# Patient Record
(demographics unavailable — no encounter records)

---

## 2025-01-14 NOTE — HISTORY OF PRESENT ILLNESS
[FreeTextEntry1] : Ms. Chavez is an 86yo F with PMHx of CAD s/p CABG 2006 (LIMA to LAD/Diag, SVG to OM and RCA), HFrEF (EF 45%) s/p BiV ICD, HTN, HLD and DM presents for follow up. Her PMD is Dr. Chuckie Newton and previously followed with Dr. Anselmo Damon. She is feeling well. She has not had her ICD checked in a few years. She states it will beep at night. Denies CP, SOB, palpitations.  -Patient continues to feel well. Hasn't established care with EP for ICD check. Recent lab work with elevated potassium.  04/26/23-Patient was found to have sustained VT on ICD. She denies any symptoms of CP, SOB, palpitations. She is hesitant about further testing.  09/20/23-She is feeling well. She thought about stress test and would like to defer. She feels well and she "is at peace if she dies tomorrow." Denies CP, SOB, palpitations.  01/14/25-Patient hospitalized in December for fall and infection. She has been feeling well overall since the fall. She had about 3 falls throughout the past year. She otherwise feels well.

## 2025-01-14 NOTE — REASON FOR VISIT
[Other: ____] : [unfilled] [FreeTextEntry1] : Diagnostic Tests: -------------------- EK25-A-sensed, V-paced.  23-A-sensed, V-paced. Also DC Paced rhythm.  23-A -sensed, V-paced.  22-A-sensed, V-paced. Also A-paced, V-paced rhythm.  22-A-sensed, V-paced.  22-Sinus bradycardia with V-paced complexes and A-pacing. IVCD, LAD. Inferior infarct.   -------------------- Echo:  22-TTE: EF 60%. Grade I DD. Borderline reduced RV function. Mild LAE. Mild MV thickening, Mild-mod TR. Mild PI.

## 2025-04-27 NOTE — REASON FOR VISIT
[Initial Evaluation] : an initial evaluation [Family Member] : family member [Pre-Visit Preparation] : pre-visit preparation was done

## 2025-04-27 NOTE — PHYSICAL EXAM
[No Acute Distress] : no acute distress [Normal Voice/Communication] : normal voice communication [Normal Sclera/Conjunctiva] : normal sclera/conjunctiva [Normal Outer Ear/Nose] : the ears and nose were normal in appearance [Normal Oropharynx] : the oropharynx was normal [Normal Nasal Mucosa] : the nasal mucosa was normal [No LAD] : no lymphadenopathy [Thyroid Normal, No Nodules] : the thyroid was normal and there were no nodules present [No Respiratory Distress] : no respiratory distress [Clear to Auscultation] : lungs were clear to auscultation bilaterally [No Accessory Muscle Use] : no accessory muscle use [Normal Rate] : heart rate was normal  [Regular Rhythm] : with a regular rhythm [Normal S1, S2] : normal S1 and S2 [No Carotid Bruit] : No carotid bruit [No Edema] : there was no peripheral edema [Non Tender] : non-tender [Soft] : abdomen soft [Not Distended] : not distended [Normal Post Cervical Nodes] : no posterior cervical lymphadenopathy [Normal Femoral Nodes] : no femoral lymphadenopathy [No CVA Tenderness] : no ~M costovertebral angle tenderness [No Spinal Tenderness] : no spinal tenderness [Normal Affect] : the affect was normal [Normal Mood] : the mood was normal

## 2025-04-27 NOTE — HISTORY OF PRESENT ILLNESS
[Patient] : patient [Family Member] : family member [FreeTextEntry1] : Old age, h/o ICD (CAD, VT), HFrEF.  [FreeTextEntry2] : Dai Chavez is an 85-year-old female with a history of coronary artery disease status post CABG, HFREF ( EF 45%), hypertension, hyperlipidemia, uncontrolled diabetes with a last A1C of 15.5, depression, and ICD placement. She was seen and examined at her home in the presence of her son for an initial evaluation. The patient denies any complaints today. She is still actively smoking and does not want to stop. Her depression is well controlled on Zoloft 50 mg daily. Her blood pressure is elevated today at 184/86. The patient states she is upset because I am a new doctor and she gets high blood pressure in the presence of a doctor. She denies any dizziness, headache, shortness of breath, chest pain, palpitations, gastric, or urinary problems.  After the interview, her son expressed concerns about the patient's memory problems. She is scheduled to be evaluated by a neurologist for dementia. I advised her son to follow up with neurology, and I will follow up after the neurological evaluation. Extensive counseling was done.   All reviews of systems were unremarkable as mentioned below.

## 2025-04-27 NOTE — ASSESSMENT
[FreeTextEntry1] : #) Establish care with the new doctor. Patient history updated in the EMR.  Medication reconciled. Blood workup scheduled. All medical problems discussed with the patient in detail. All questions answered.  #) Coronary Artery Disease: Patient is stable. Denies any chest pain or palpitations. Following up with cardiology. Continue current medications.  #) HFREF (45%): Patient's heart failure symptoms are well controlled. Clinically, no signs of CHF. Counseling done. Exercise advised. Continue low salt diet.  #) Hypertension: Continue current medication. Blood pressure elevated today. Advised her son to monitor blood pressure at home and call if it remains elevated. Home readings are acceptable as per her son. Today's high reading attributed to white coat hypertension as per the patient.  #) Hyperlipidemia: Continue simvastatin 10 mg daily. Check blood workup for cholesterol. Low fat diet and exercise advised.  #) Depression: Depression well controlled on Zoloft 50 mg daily. Extensive counseling done. Patient is asymptomatic.  #) Uncontrolled Diabetes: Patient is taking metformin 500 mg twice daily, Actos 30 mg daily, and glimepiride 1 mg daily. Advised to reduce carbs and sweets to control diabetes. Will repeat HbA1c. Exercise advised.  #) Memory Problems: Son is concerned about her memory issues. Scheduled to be evaluated by a neurologist for possible dementia. Will follow up after the neurological evaluation.  HCM:  Explained the home visit program at [facility]. Advised patient and her son to call us if any issues arise. All contact information provided. Will follow up with the patient in four weeks after the blood workup.

## 2025-07-15 NOTE — ASSESSMENT
[FreeTextEntry1] : HFrEF s/p ICD: ICM with EF 45%, documented with BiV ICD. NYHA Class I symptoms. EF has now normalized.  -Continue with benazapril 20mg PO daily, HCTZ 12.5mg PO daily, Toprol 100mg PO daily and simvastatin 10mg PO daily.  -ICD interrogation with EP. Referral placed. Will continue to discuss with patient importance of interrogation.   Sustained VT: On ICD -Previously discussed stress testing and patient does not want to pursue.  -EP referral for ICD checks.   Hyperkalemia: K 5.4->Resolved.  -Pt to cut back on potassium rich foods. -Likely not due to ACE given pt has been on long term.    HTN: BP at goal per ACC/AHA 2018 guidelines -Continue benazepril 10mg PO daily.  -Will continue to monitor.   HLD: , TG 97, HDL 33, LDL 79 (12/24)->, , HDL 35,  (05/25) -Labwork with PMD. -Continue with simvastatin 10mg PO daily.   DM: HA1c 10.9 (12/24)->10.7% (05/25) -Continue with metformin, glimedpiride, pioglitazone, simvastatin.   Follow up in 6 months.  -Check TTE.

## 2025-07-15 NOTE — HISTORY OF PRESENT ILLNESS
[FreeTextEntry1] : Ms. Chavez is an 86yo F with PMHx of CAD s/p CABG 2006 (LIMA to LAD/Diag, SVG to OM and RCA), HFrEF (EF 45%) s/p BiV ICD, HTN, HLD and DM presents for follow up. Her PMD is Dr. Chuckie Newton and previously followed with Dr. Anselmo Damon. She is feeling well. She has not had her ICD checked in a few years. She states it will beep at night. Denies CP, SOB, palpitations.  -Patient continues to feel well. Hasn't established care with EP for ICD check. Recent lab work with elevated potassium.  04/26/23-Patient was found to have sustained VT on ICD. She denies any symptoms of CP, SOB, palpitations. She is hesitant about further testing.  09/20/23-She is feeling well. She thought about stress test and would like to defer. She feels well and she "is at peace if she dies tomorrow." Denies CP, SOB, palpitations.  01/14/25-Patient hospitalized in December for fall and infection. She has been feeling well overall since the fall. She had about 3 falls throughout the past year. She otherwise feels well.  07/15/25-Patient feeling well. Patient started seeing [facility] for PMD. Pt started smoking again, but she doesn't inhale.